# Patient Record
Sex: MALE | Race: WHITE | NOT HISPANIC OR LATINO | Employment: STUDENT | URBAN - METROPOLITAN AREA
[De-identification: names, ages, dates, MRNs, and addresses within clinical notes are randomized per-mention and may not be internally consistent; named-entity substitution may affect disease eponyms.]

---

## 2023-03-04 PROBLEM — L60.0 INGROWN TOENAIL: Status: ACTIVE | Noted: 2023-03-04

## 2023-03-04 PROBLEM — R41.840 ATTENTION DISTURBANCE: Status: ACTIVE | Noted: 2023-03-04

## 2023-03-04 PROBLEM — J01.90 ACUTE SINUS INFECTION: Status: ACTIVE | Noted: 2023-03-04

## 2023-03-04 PROBLEM — H60.92 OTITIS EXTERNA, LEFT: Status: ACTIVE | Noted: 2023-03-04

## 2023-03-04 PROBLEM — F41.9 ANXIETY: Status: ACTIVE | Noted: 2023-03-04

## 2023-03-04 PROBLEM — J06.9 VIRAL URI WITH COUGH: Status: ACTIVE | Noted: 2023-03-04

## 2023-03-04 PROBLEM — F32.A DEPRESSION: Status: ACTIVE | Noted: 2023-03-04

## 2023-03-04 PROBLEM — F98.8 ATTENTION DEFICIT DISORDER (ADD): Status: ACTIVE | Noted: 2023-03-04

## 2023-03-04 RX ORDER — SERTRALINE HYDROCHLORIDE 100 MG/1
1 TABLET, FILM COATED ORAL DAILY
COMMUNITY
Start: 2022-08-23

## 2023-03-13 ENCOUNTER — TELEMEDICINE (OUTPATIENT)
Dept: PEDIATRICS | Facility: CLINIC | Age: 23
End: 2023-03-13
Payer: COMMERCIAL

## 2023-03-13 DIAGNOSIS — F41.9 ANXIETY: Primary | ICD-10-CM

## 2023-03-13 PROCEDURE — 99213 OFFICE O/P EST LOW 20 MIN: CPT | Performed by: PEDIATRICS

## 2023-03-13 NOTE — PROGRESS NOTES
Subjective   Vargas Gilman is a 22 y.o. male who presents for follow up of anxiety disorder. Current symptoms include: Excessive anxiety/worry, Difficulty controlling worry, Difficulty concentrating/going blank, Fatigue, and Sleep changes--insomnia .    Other symptoms include:  fatigue  Symptoms are:  worse  Current suicidal risk:  None Reported  He complains of the following side effects from the treatment:  He previously had sexual dysfunction from the SSRI medication.  He stopped taking the Sertraline 3-4 months ago .   He is succeeding academically, but feels overwhelmed and is avoiding things that he used to enjoy due to anxiety.  He has difficulty sleeping and is taking 12 mg of melatonin at bedtime.  He is on his computer all day long-right up to bedtime.    He felt more engaged and enjoyed activities more when taking the Sertraline.        Objective   There were no vitals taken for this visit.   Physical Exam  Constitutional:       Appearance: Normal appearance.   Neurological:      Mental Status: He is alert.   Psychiatric:         Thought Content: Thought content normal.         Judgment: Judgment normal.         Assessment/Plan   Anxiety Disorder - worsening  Discussed restarting Sertraline pending appointment with psychiatry.  Medications: Selective Seratonin Reuptake Inhibitor sertraline 25 mg daily for 3 days, then 50 mg daily for 7 days, then 100 mg.  Counseling:  currently not in counseling.   Call with an update in 2-3 weeks.

## 2024-08-12 DIAGNOSIS — F41.9 ANXIETY DISORDER, UNSPECIFIED: ICD-10-CM

## 2024-08-12 RX ORDER — VILAZODONE HYDROCHLORIDE 40 MG/1
40 TABLET ORAL
Qty: 90 TABLET | Refills: 3 | Status: SHIPPED | OUTPATIENT
Start: 2024-08-12

## 2024-09-27 ENCOUNTER — APPOINTMENT (OUTPATIENT)
Dept: BEHAVIORAL HEALTH | Facility: CLINIC | Age: 24
End: 2024-09-27
Payer: COMMERCIAL

## 2024-09-27 DIAGNOSIS — F34.1 PERSISTENT DEPRESSIVE DISORDER: ICD-10-CM

## 2024-09-27 PROCEDURE — 99213 OFFICE O/P EST LOW 20 MIN: CPT | Performed by: PSYCHIATRY & NEUROLOGY

## 2024-09-27 RX ORDER — ARIPIPRAZOLE 5 MG/1
5 TABLET ORAL DAILY
Qty: 30 TABLET | Refills: 2 | Status: SHIPPED | OUTPATIENT
Start: 2024-09-27 | End: 2024-12-26

## 2024-09-27 ASSESSMENT — ENCOUNTER SYMPTOMS
DYSPHORIC MOOD: 1
NERVOUS/ANXIOUS: 1

## 2024-09-27 NOTE — ASSESSMENT & PLAN NOTE
Add Abilify 5 mg daily in the morning while continuing to take Viibryd 40 mg.  Warnings about akathisia, if this occurs cut dose in half.  Follow-up 4 weeks

## 2024-09-27 NOTE — PROGRESS NOTES
"Subjective   Patient ID: Vargas Gilman is a 23 y.o. male who presents for med management visit virtual.Virtual or Telephone Consent    An interactive audio and video telecommunication system which permits real time communications between the patient (at the originating site) and provider (at the distant site) was utilized to provide this telehealth service.   Verbal consent was requested and obtained from Vargas Gilman on this date, 09/27/24 for a telehealth visit.    HPI patient reports \"not doing well mentally\" high level of dread and feeling worthless.  Cannot motivate himself and not enjoying himself.  Not getting enjoyment interacting with people either.  Taking 40 mg of VIIBRYD.  Denies suicidal ideation.    Review of Systems   Psychiatric/Behavioral:  Positive for dysphoric mood. The patient is nervous/anxious.        Objective   Physical Exam  Psychiatric:         Attention and Perception: Attention and perception normal.         Mood and Affect: Mood is depressed. Affect is blunt.         Speech: Speech normal.         Behavior: Behavior normal. Behavior is cooperative.         Thought Content: Thought content normal. Thought content does not include suicidal ideation. Thought content does not include suicidal plan.         Cognition and Memory: Cognition and memory normal.         Judgment: Judgment normal.         Assessment/Plan   Problem List Items Addressed This Visit             ICD-10-CM    Depression F32.A     Add Abilify 5 mg daily in the morning while continuing to take Viibryd 40 mg.  Warnings about akathisia, if this occurs cut dose in half.  Follow-up 4 weeks         Relevant Medications    ARIPiprazole (Abilify) 5 mg tablet            Malik Fall MD 09/27/24 12:49 PM   "

## 2024-11-01 ENCOUNTER — APPOINTMENT (OUTPATIENT)
Dept: BEHAVIORAL HEALTH | Facility: CLINIC | Age: 24
End: 2024-11-01
Payer: COMMERCIAL

## 2025-01-21 DIAGNOSIS — F41.9 ANXIETY: Primary | ICD-10-CM

## 2025-01-21 DIAGNOSIS — F34.1 PERSISTENT DEPRESSIVE DISORDER: ICD-10-CM

## 2025-02-18 ENCOUNTER — OFFICE VISIT (OUTPATIENT)
Dept: PRIMARY CARE | Facility: CLINIC | Age: 25
End: 2025-02-18
Payer: COMMERCIAL

## 2025-02-18 VITALS
HEART RATE: 78 BPM | HEIGHT: 73 IN | SYSTOLIC BLOOD PRESSURE: 121 MMHG | OXYGEN SATURATION: 97 % | BODY MASS INDEX: 26.88 KG/M2 | WEIGHT: 202.8 LBS | DIASTOLIC BLOOD PRESSURE: 74 MMHG

## 2025-02-18 DIAGNOSIS — Z00.00 ANNUAL PHYSICAL EXAM: Primary | ICD-10-CM

## 2025-02-18 DIAGNOSIS — R11.2 NAUSEA AND VOMITING, UNSPECIFIED VOMITING TYPE: ICD-10-CM

## 2025-02-18 DIAGNOSIS — F41.9 ANXIETY: ICD-10-CM

## 2025-02-18 DIAGNOSIS — K21.00 GASTROESOPHAGEAL REFLUX DISEASE WITH ESOPHAGITIS WITHOUT HEMORRHAGE: ICD-10-CM

## 2025-02-18 DIAGNOSIS — R11.0 NAUSEA: ICD-10-CM

## 2025-02-18 DIAGNOSIS — K20.90 ESOPHAGITIS: ICD-10-CM

## 2025-02-18 PROBLEM — H60.92 OTITIS EXTERNA, LEFT: Status: RESOLVED | Noted: 2023-03-04 | Resolved: 2025-02-18

## 2025-02-18 PROBLEM — J01.90 ACUTE SINUS INFECTION: Status: RESOLVED | Noted: 2023-03-04 | Resolved: 2025-02-18

## 2025-02-18 PROBLEM — J06.9 VIRAL URI WITH COUGH: Status: RESOLVED | Noted: 2023-03-04 | Resolved: 2025-02-18

## 2025-02-18 PROCEDURE — 99385 PREV VISIT NEW AGE 18-39: CPT | Performed by: INTERNAL MEDICINE

## 2025-02-18 PROCEDURE — 1036F TOBACCO NON-USER: CPT | Performed by: INTERNAL MEDICINE

## 2025-02-18 PROCEDURE — 3008F BODY MASS INDEX DOCD: CPT | Performed by: INTERNAL MEDICINE

## 2025-02-18 RX ORDER — OMEPRAZOLE 40 MG/1
40 CAPSULE, DELAYED RELEASE ORAL
Qty: 30 CAPSULE | Refills: 2 | Status: SHIPPED | OUTPATIENT
Start: 2025-02-18

## 2025-02-18 RX ORDER — CHOLECALCIFEROL (VITAMIN D3) 25 MCG
1000 TABLET ORAL DAILY
COMMUNITY

## 2025-02-18 ASSESSMENT — ENCOUNTER SYMPTOMS
ABDOMINAL DISTENTION: 0
CHEST TIGHTNESS: 0
CHOKING: 0
DIFFICULTY URINATING: 0
SLEEP DISTURBANCE: 0
SHORTNESS OF BREATH: 0
PALPITATIONS: 0
TROUBLE SWALLOWING: 0
LIGHT-HEADEDNESS: 0
NUMBNESS: 0
DIARRHEA: 1
HEMATURIA: 0
FEVER: 0
APPETITE CHANGE: 1
NAUSEA: 1
BLOOD IN STOOL: 0
COUGH: 0
VOMITING: 1
DYSURIA: 0
CHILLS: 0
SORE THROAT: 0

## 2025-02-18 ASSESSMENT — PATIENT HEALTH QUESTIONNAIRE - PHQ9
10. IF YOU CHECKED OFF ANY PROBLEMS, HOW DIFFICULT HAVE THESE PROBLEMS MADE IT FOR YOU TO DO YOUR WORK, TAKE CARE OF THINGS AT HOME, OR GET ALONG WITH OTHER PEOPLE: SOMEWHAT DIFFICULT
2. FEELING DOWN, DEPRESSED OR HOPELESS: SEVERAL DAYS
SUM OF ALL RESPONSES TO PHQ9 QUESTIONS 1 AND 2: 1
1. LITTLE INTEREST OR PLEASURE IN DOING THINGS: NOT AT ALL

## 2025-02-18 ASSESSMENT — PAIN SCALES - GENERAL: PAINLEVEL_OUTOF10: 0-NO PAIN

## 2025-02-18 NOTE — ASSESSMENT & PLAN NOTE
Patient currently taking Viibryd 40 mg daily. States his previous psychiatrist passed away 2 years ago and hasn't seen anyone since, plus [place] report does not show any prescriptions so it is unclear how he's continued this medciation. He has appt scheduled for March 6th at [facility] psychiatry practice.

## 2025-02-18 NOTE — ASSESSMENT & PLAN NOTE
Patient has 3 week hx of nausea/vomiting/dyspepsia (see HPI). Denies hematemesis.     -Start omeprazole 40 mg daily empiric treatment  -Ordered urea breath test to r/o H pylori infection  -Ordered lipase and amylase to r/o pancreatic dysfunction  -Follow up in 3-4 weeks to reassess symptoms

## 2025-02-18 NOTE — ASSESSMENT & PLAN NOTE
Patient has 3 week hx of nausea/vomiting/dyspepsia (see HPI). Denies hematemesis.      -Start omeprazole 40 mg daily  -Ordered urea breath test to r/o H pylori infection  -Ordered lipase and amylase to r/o pancreatic dysfunction  -Follow up in 3-4 weeks to reassess symptoms

## 2025-02-18 NOTE — PATIENT INSTRUCTIONS
Please get fasting labs and breath test    Please follow up with Dr. Martinez in 3 to 4 weeks  30 min appointment

## 2025-02-18 NOTE — PROGRESS NOTES
Subjective   Patient ID: Vargas Gilman is a 24 y.o. male who presents for No chief complaint on file..  Patient presents to establish care and 3 week history of nausea/vomiting. Patient wakes up every morning and feels nauseous, shaky, and will vomit before breakfast and reports its watery with some acidic content, denies hematemesis. After he vomits he will feel slightly better but nausea will continue for 5-6 hours afterward and is associated with a sense of indigestion and sometimes waterbrash. He has tried taking Tums and using ninoska chews to alleviate his symptoms however it has been no help, in the past he's eaten marijuana edibles but has not taken one since his symptoms started. He denies any recent illness, sick contacts, or travel.    He also reports recent changes in his bowel movements; normally he'd have 2 bowel movements per week but recently has had watery diarrhea once a day and notes it's been more foul-smelling than usual. Also, he's had increased flatulence. He denies any black/tarry stools, hematochezia, or straining with BM's. Patient was advised to avoid marijuana until he's seen again in 3-4 weeks to reassess. We discussed getting an H pylori breath test and additional blood work in addition to annual routine labs to r/o an upper GI issue.    Patient is currently taking 40 mg Viibryd daily but does not have an established psychiatrist. He previously used Abilify 5 mg daily as well but did not get this refilled and so was splitting tablets in half until he ran out about 3 weeks ago. He reports he has an upcoming appt in March with a new psychiatrist where he hopes to establish care.         Review of Systems   Constitutional:  Positive for appetite change. Negative for chills and fever.   HENT:  Negative for ear discharge, ear pain, sore throat and trouble swallowing.    Eyes:  Negative for visual disturbance.   Respiratory:  Negative for cough, choking, chest tightness and shortness of  "breath.    Cardiovascular:  Negative for chest pain, palpitations and leg swelling.   Gastrointestinal:  Positive for diarrhea, nausea and vomiting. Negative for abdominal distention and blood in stool.        Dyspepsia.   Genitourinary:  Negative for difficulty urinating, dysuria and hematuria.   Skin:  Negative for rash.   Neurological:  Negative for syncope, light-headedness and numbness.   Psychiatric/Behavioral:  Negative for sleep disturbance.        Objective   Medication Documentation Review Audit       Reviewed by Vesna Martinez MD (Physician) on 25 at 1118      Medication Order Taking? Sig Documenting Provider Last Dose Status   ARIPiprazole (Abilify) 5 mg tablet [id number]  Take 1 tablet (5 mg) by mouth once daily. Malik Fall MD   24 2359   cholecalciferol (Vitamin D3) 25 MCG (1000 UT) tablet [id number]  Take 1 tablet (1,000 Units) by mouth once daily. Historical Provider, MD  Active   omeprazole (PriLOSEC) 40 mg DR capsule [id number]  Take 1 capsule (40 mg) by mouth once daily in the morning. Take before meals. Do not crush or chew. Vesna Martinez MD  Active   vilazodone (Viibryd) 40 mg tablet [id number] Yes TAKE 1 TABLET BY MOUTH EVERY MORNING Malik Fall MD  Active                  No Known Allergies    /74   Pulse 78   Ht 1.848 m (6' 0.75\")   Wt 92 kg (202 lb 12.8 oz)   SpO2 97%   BMI 26.94 kg/m²     Physical Exam  Constitutional:       General: He is not in acute distress.     Appearance: Normal appearance. He is not ill-appearing.   HENT:      Head: Normocephalic and atraumatic.      Right Ear: Tympanic membrane, ear canal and external ear normal. There is no impacted cerumen.      Left Ear: Tympanic membrane, ear canal and external ear normal. There is no impacted cerumen.      Mouth/Throat:      Mouth: Mucous membranes are moist.      Pharynx: Oropharynx is clear. No oropharyngeal exudate or posterior oropharyngeal erythema.   Eyes:      Extraocular Movements: " Extraocular movements intact.      Pupils: Pupils are equal, round, and reactive to light.   Cardiovascular:      Rate and Rhythm: Normal rate and regular rhythm.      Pulses: Normal pulses.      Heart sounds: Normal heart sounds. No murmur heard.  Pulmonary:      Effort: Pulmonary effort is normal.      Breath sounds: Normal breath sounds. No wheezing, rhonchi or rales.   Abdominal:      General: Abdomen is flat. Bowel sounds are normal. There is no distension.      Palpations: Abdomen is soft. There is no mass.      Tenderness: There is no abdominal tenderness. There is no guarding.   Genitourinary:     Penis: Normal.       Testes: Normal.   Musculoskeletal:         General: Normal range of motion.      Cervical back: Normal range of motion and neck supple. No rigidity or tenderness.      Right lower leg: No edema.      Left lower leg: No edema.   Skin:     General: Skin is warm and dry.   Neurological:      General: No focal deficit present.      Mental Status: He is alert and oriented to person, place, and time. Mental status is at baseline.      Sensory: No sensory deficit.      Motor: No weakness.   Psychiatric:         Mood and Affect: Mood normal.         Behavior: Behavior normal.         Thought Content: Thought content normal.         Judgment: Judgment normal.           Assessment/Plan   Problem List Items Addressed This Visit       Anxiety     Patient currently taking Viibryd 40 mg daily. States his previous psychiatrist passed away 2 years ago and hasn't seen anyone since, plus [place] report does not show any prescriptions so it is unclear how he's continued this medciation. He has appt scheduled for March 6th at [facility].          Annual physical exam - Primary     -Ordered routine annual labs including CBC, CMP, TSH, Vitamin D, lipid panel         Relevant Orders    Lipid Panel    CBC    Comprehensive Metabolic Panel    TSH with reflex to Free T4 if abnormal    Vitamin D  25-Hydroxy,Total (for eval of Vitamin D levels)    H. Pylori Breath Test    Amylase    Lipase    Nausea and vomiting     Patient has 3 week hx of nausea/vomiting/dyspepsia (see HPI). Denies hematemesis.      -Start omeprazole 40 mg daily  -Ordered urea breath test to r/o H pylori infection  -Ordered lipase and amylase to r/o pancreatic dysfunction  -Follow up in 3-4 weeks to reassess symptoms         Relevant Medications    omeprazole (PriLOSEC) 40 mg DR capsule    Other Relevant Orders    Lipid Panel    CBC    Comprehensive Metabolic Panel    TSH with reflex to Free T4 if abnormal    Vitamin D 25-Hydroxy,Total (for eval of Vitamin D levels)    H. Pylori Breath Test    Amylase    Lipase    Gastroesophageal reflux disease with esophagitis without hemorrhage     Patient has 3 week hx of nausea/vomiting/dyspepsia (see HPI). Denies hematemesis.     -Start omeprazole 40 mg daily empiric treatment  -Ordered urea breath test to r/o H pylori infection  -Ordered lipase and amylase to r/o pancreatic dysfunction  -Follow up in 3-4 weeks to reassess symptoms         Relevant Medications    omeprazole (PriLOSEC) 40 mg DR capsule    Other Relevant Orders    Lipid Panel    CBC    Comprehensive Metabolic Panel    TSH with reflex to Free T4 if abnormal    Vitamin D 25-Hydroxy,Total (for eval of Vitamin D levels)    H. Pylori Breath Test    Amylase    Lipase       This note or encounter for this patient visit included a student.  I have independently interviewed the patient, performed a physical exam and performed my own assessment and plan and orders.         It has been a pleasure seeing you.  Vesna Martinez MD

## 2025-02-21 LAB
25(OH)D3+25(OH)D2 SERPL-MCNC: 16 NG/ML (ref 30–100)
ALBUMIN SERPL-MCNC: 4.8 G/DL (ref 3.6–5.1)
ALP SERPL-CCNC: 49 U/L (ref 36–130)
ALT SERPL-CCNC: 55 U/L (ref 9–46)
AMYLASE SERPL-CCNC: 59 U/L (ref 21–101)
ANION GAP SERPL CALCULATED.4IONS-SCNC: 10 MMOL/L (CALC) (ref 7–17)
AST SERPL-CCNC: 24 U/L (ref 10–40)
BILIRUB SERPL-MCNC: 1.2 MG/DL (ref 0.2–1.2)
BUN SERPL-MCNC: 12 MG/DL (ref 7–25)
CALCIUM SERPL-MCNC: 9.9 MG/DL (ref 8.6–10.3)
CHLORIDE SERPL-SCNC: 108 MMOL/L (ref 98–110)
CHOLEST SERPL-MCNC: 211 MG/DL
CHOLEST/HDLC SERPL: 5.7 (CALC)
CO2 SERPL-SCNC: 23 MMOL/L (ref 20–32)
CREAT SERPL-MCNC: 0.95 MG/DL (ref 0.6–1.24)
EGFRCR SERPLBLD CKD-EPI 2021: 115 ML/MIN/1.73M2
ERYTHROCYTE [DISTWIDTH] IN BLOOD BY AUTOMATED COUNT: 12.8 % (ref 11–15)
GLUCOSE SERPL-MCNC: 103 MG/DL (ref 65–99)
HCT VFR BLD AUTO: 48.5 % (ref 38.5–50)
HDLC SERPL-MCNC: 37 MG/DL
HGB BLD-MCNC: 16.6 G/DL (ref 13.2–17.1)
LDLC SERPL CALC-MCNC: 142 MG/DL (CALC)
LIPASE SERPL-CCNC: 35 U/L (ref 7–60)
MCH RBC QN AUTO: 31.3 PG (ref 27–33)
MCHC RBC AUTO-ENTMCNC: 34.2 G/DL (ref 32–36)
MCV RBC AUTO: 91.5 FL (ref 80–100)
NONHDLC SERPL-MCNC: 174 MG/DL (CALC)
PLATELET # BLD AUTO: 311 THOUSAND/UL (ref 140–400)
PMV BLD REES-ECKER: 10.6 FL (ref 7.5–12.5)
POTASSIUM SERPL-SCNC: 4.3 MMOL/L (ref 3.5–5.3)
PROT SERPL-MCNC: 6.9 G/DL (ref 6.1–8.1)
RBC # BLD AUTO: 5.3 MILLION/UL (ref 4.2–5.8)
SODIUM SERPL-SCNC: 141 MMOL/L (ref 135–146)
TRIGL SERPL-MCNC: 187 MG/DL
TSH SERPL-ACNC: 0.95 MIU/L (ref 0.4–4.5)
UREA BREATH TEST QL: NOT DETECTED
WBC # BLD AUTO: 5.4 THOUSAND/UL (ref 3.8–10.8)

## 2025-02-25 ENCOUNTER — APPOINTMENT (OUTPATIENT)
Dept: PRIMARY CARE | Facility: CLINIC | Age: 25
End: 2025-02-25
Payer: COMMERCIAL

## 2025-02-25 ENCOUNTER — TELEPHONE (OUTPATIENT)
Dept: PRIMARY CARE | Facility: CLINIC | Age: 25
End: 2025-02-25

## 2025-02-25 DIAGNOSIS — K21.00 GASTROESOPHAGEAL REFLUX DISEASE WITH ESOPHAGITIS WITHOUT HEMORRHAGE: ICD-10-CM

## 2025-02-25 DIAGNOSIS — R11.14 BILIOUS VOMITING WITH NAUSEA: Primary | ICD-10-CM

## 2025-02-25 NOTE — TELEPHONE ENCOUNTER
----- Message from Vesna Martinez sent at 2/25/2025  7:48 AM EST -----  Please notify patient most of his testing looks pretty good.  The breath test was negative for an infection causing his stomach symptoms or reflux  Amylase and lipase were both normal meaning he does not have pancreatitis.  Complete blood count was unremarkable showing no acute infection    Patient's cholesterol level is elevated with an LDL of 142.  LDL should be less than 100 so he needs to work on a low-fat diet  Chemistry profile had a mild elevation in one of his liver test and his sugar or glucose which makes him prediabetic.  We will discuss these labs in more detail at his appointment in March.  Please make sure he keeps that appointment    Finally patient's vitamin D level is very low.  I would like him to take 5000 international units of vitamin D3 every day until he sees me in March and we will discuss further treatment at that time.

## 2025-03-06 ENCOUNTER — APPOINTMENT (OUTPATIENT)
Dept: BEHAVIORAL HEALTH | Facility: CLINIC | Age: 25
End: 2025-03-06
Payer: COMMERCIAL

## 2025-03-06 DIAGNOSIS — F41.1 GAD (GENERALIZED ANXIETY DISORDER): ICD-10-CM

## 2025-03-06 DIAGNOSIS — F33.1 MODERATE EPISODE OF RECURRENT MAJOR DEPRESSIVE DISORDER: Primary | ICD-10-CM

## 2025-03-06 DIAGNOSIS — F42.8 OBSESSIVE THINKING: ICD-10-CM

## 2025-03-06 PROBLEM — F41.9 ANXIETY: Status: RESOLVED | Noted: 2023-03-04 | Resolved: 2025-03-06

## 2025-03-06 PROCEDURE — 1036F TOBACCO NON-USER: CPT | Performed by: NURSE PRACTITIONER

## 2025-03-06 PROCEDURE — 90792 PSYCH DIAG EVAL W/MED SRVCS: CPT | Performed by: NURSE PRACTITIONER

## 2025-03-06 RX ORDER — CALCIUM CARBONATE 200(500)MG
1 TABLET,CHEWABLE ORAL 4 TIMES DAILY PRN
COMMUNITY

## 2025-03-06 RX ORDER — VILAZODONE HYDROCHLORIDE 40 MG/1
40 TABLET ORAL
Qty: 90 TABLET | Refills: 3 | Status: SHIPPED | OUTPATIENT
Start: 2025-03-06 | End: 2026-03-06

## 2025-03-06 ASSESSMENT — ANXIETY QUESTIONNAIRES
IF YOU CHECKED OFF ANY PROBLEMS ON THIS QUESTIONNAIRE, HOW DIFFICULT HAVE THESE PROBLEMS MADE IT FOR YOU TO DO YOUR WORK, TAKE CARE OF THINGS AT HOME, OR GET ALONG WITH OTHER PEOPLE: VERY DIFFICULT
5. BEING SO RESTLESS THAT IT IS HARD TO SIT STILL: SEVERAL DAYS
6. BECOMING EASILY ANNOYED OR IRRITABLE: SEVERAL DAYS
1. FEELING NERVOUS, ANXIOUS, OR ON EDGE: MORE THAN HALF THE DAYS
GAD7 TOTAL SCORE: 11
7. FEELING AFRAID AS IF SOMETHING AWFUL MIGHT HAPPEN: SEVERAL DAYS
3. WORRYING TOO MUCH ABOUT DIFFERENT THINGS: MORE THAN HALF THE DAYS
2. NOT BEING ABLE TO STOP OR CONTROL WORRYING: MORE THAN HALF THE DAYS
4. TROUBLE RELAXING: MORE THAN HALF THE DAYS

## 2025-03-06 ASSESSMENT — PATIENT HEALTH QUESTIONNAIRE - PHQ9
7. TROUBLE CONCENTRATING ON THINGS, SUCH AS READING THE NEWSPAPER OR WATCHING TELEVISION: SEVERAL DAYS
9. THOUGHTS THAT YOU WOULD BE BETTER OFF DEAD, OR OF HURTING YOURSELF: NOT AT ALL
8. MOVING OR SPEAKING SO SLOWLY THAT OTHER PEOPLE COULD HAVE NOTICED. OR THE OPPOSITE, BEING SO FIGETY OR RESTLESS THAT YOU HAVE BEEN MOVING AROUND A LOT MORE THAN USUAL: NOT AT ALL
6. FEELING BAD ABOUT YOURSELF - OR THAT YOU ARE A FAILURE OR HAVE LET YOURSELF OR YOUR FAMILY DOWN: MORE THAN HALF THE DAYS
10. IF YOU CHECKED OFF ANY PROBLEMS, HOW DIFFICULT HAVE THESE PROBLEMS MADE IT FOR YOU TO DO YOUR WORK, TAKE CARE OF THINGS AT HOME, OR GET ALONG WITH OTHER PEOPLE: VERY DIFFICULT
1. LITTLE INTEREST OR PLEASURE IN DOING THINGS: MORE THAN HALF THE DAYS
5. POOR APPETITE OR OVEREATING: MORE THAN HALF THE DAYS
4. FEELING TIRED OR HAVING LITTLE ENERGY: MORE THAN HALF THE DAYS
2. FEELING DOWN, DEPRESSED OR HOPELESS: MORE THAN HALF THE DAYS
3. TROUBLE FALLING OR STAYING ASLEEP OR SLEEPING TOO MUCH: SEVERAL DAYS
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
## 2025-03-11 ENCOUNTER — OFFICE VISIT (OUTPATIENT)
Dept: PRIMARY CARE | Facility: CLINIC | Age: 25
End: 2025-03-11
Payer: COMMERCIAL

## 2025-03-11 ENCOUNTER — APPOINTMENT (OUTPATIENT)
Dept: PRIMARY CARE | Facility: CLINIC | Age: 25
End: 2025-03-11
Payer: COMMERCIAL

## 2025-03-11 VITALS
HEIGHT: 73 IN | BODY MASS INDEX: 27.17 KG/M2 | SYSTOLIC BLOOD PRESSURE: 127 MMHG | DIASTOLIC BLOOD PRESSURE: 83 MMHG | HEART RATE: 76 BPM | OXYGEN SATURATION: 98 % | WEIGHT: 205 LBS

## 2025-03-11 DIAGNOSIS — R79.89 ELEVATED LFTS: ICD-10-CM

## 2025-03-11 DIAGNOSIS — K29.70 GASTRITIS WITHOUT BLEEDING, UNSPECIFIED CHRONICITY, UNSPECIFIED GASTRITIS TYPE: ICD-10-CM

## 2025-03-11 DIAGNOSIS — K20.90 ESOPHAGITIS: ICD-10-CM

## 2025-03-11 DIAGNOSIS — R73.01 ELEVATED FASTING GLUCOSE: ICD-10-CM

## 2025-03-11 DIAGNOSIS — K21.00 GASTROESOPHAGEAL REFLUX DISEASE WITH ESOPHAGITIS WITHOUT HEMORRHAGE: ICD-10-CM

## 2025-03-11 DIAGNOSIS — E78.2 MIXED HYPERLIPIDEMIA: ICD-10-CM

## 2025-03-11 DIAGNOSIS — R11.2 NAUSEA AND VOMITING, UNSPECIFIED VOMITING TYPE: ICD-10-CM

## 2025-03-11 DIAGNOSIS — R11.0 NAUSEA: ICD-10-CM

## 2025-03-11 DIAGNOSIS — E78.5 HYPERLIPIDEMIA, UNSPECIFIED HYPERLIPIDEMIA TYPE: Primary | ICD-10-CM

## 2025-03-11 PROCEDURE — 3008F BODY MASS INDEX DOCD: CPT | Performed by: INTERNAL MEDICINE

## 2025-03-11 PROCEDURE — 1036F TOBACCO NON-USER: CPT | Performed by: INTERNAL MEDICINE

## 2025-03-11 PROCEDURE — G8433 SCR FOR DEP NOT CPT DOC RSN: HCPCS | Performed by: INTERNAL MEDICINE

## 2025-03-11 PROCEDURE — 99214 OFFICE O/P EST MOD 30 MIN: CPT | Performed by: INTERNAL MEDICINE

## 2025-03-11 RX ORDER — OMEPRAZOLE 40 MG/1
40 CAPSULE, DELAYED RELEASE ORAL
Qty: 30 CAPSULE | Refills: 2 | Status: SHIPPED | OUTPATIENT
Start: 2025-03-11

## 2025-03-11 ASSESSMENT — ENCOUNTER SYMPTOMS
ABDOMINAL PAIN: 0
DYSURIA: 0
DIARRHEA: 0
FREQUENCY: 0
FATIGUE: 0
SORE THROAT: 0
LIGHT-HEADEDNESS: 0
ARTHRALGIAS: 0
CONSTIPATION: 0
TROUBLE SWALLOWING: 0
VOMITING: 1
FEVER: 0
NAUSEA: 1
DIZZINESS: 0
SHORTNESS OF BREATH: 0
PALPITATIONS: 0
COUGH: 0

## 2025-03-11 ASSESSMENT — PAIN SCALES - GENERAL: PAINLEVEL_OUTOF10: 0-NO PAIN

## 2025-03-11 NOTE — PROGRESS NOTES
Subjective   Patient ID: Vargas Gilman is a 24 y.o. male who presents for 1 month follow up for general health management.    Patient is here for follow-up after his first visit for nausea vomiting and presumed gastritis.  Blood work was reviewed with the patient in detail today including an H. pylori test which was negative.  What was more important was mild elevation in ALT at 55, LDL and triglyceride elevation in his cholesterol profile and a mild elevation in his glucose or sugar.  Patient was counseled about a low-fat low carbohydrate diet.  Of the 2 I would prefer that he concentrate on the low-fat diet    Patient notes that he has only had vomiting once or twice since he started on the omeprazole.  He did have some nausea every morning for about an hour but it is no longer acting up all day long and he is overall greatly improved.  Patient is concerned about how long he can take the omeprazole.        Review of Systems   Constitutional:  Negative for fatigue and fever.   HENT:  Negative for sore throat and trouble swallowing.    Eyes:  Negative for visual disturbance.   Respiratory:  Negative for cough and shortness of breath.    Cardiovascular:  Negative for chest pain, palpitations and leg swelling.   Gastrointestinal:  Positive for nausea and vomiting. Negative for abdominal pain, constipation and diarrhea.   Genitourinary:  Negative for dysuria and frequency.   Musculoskeletal:  Negative for arthralgias.   Skin:  Negative for rash.   Neurological:  Negative for dizziness and light-headedness.       Objective   Medication Documentation Review Audit       Reviewed by Vesna Martinez MD (Physician) on 03/11/25 at 1112      Medication Order Taking? Sig Documenting Provider Last Dose Status   calcium carbonate (Tums) 200 mg calcium chewable tablet [id number] Yes Chew 1 tablet (500 mg) 4 times a day as needed for indigestion or heartburn. Historical Provider, MD  Active   cholecalciferol (Vitamin D3) 25 MCG  "(1000 UT) tablet [id number] Yes Take 1 tablet (1,000 Units) by mouth once daily. Historical Provider, MD  Active   omeprazole (PriLOSEC) 40 mg DR capsule [id number] Yes Take 1 capsule (40 mg) by mouth once daily in the morning. Take before meals. Do not crush or chew. Vesna Martinez MD  Active     Discontinued 03/06/25 0912   vilazodone (Viibryd) 40 mg tablet [id number] Yes Take 1 tablet (40 mg) by mouth once daily in the morning. Take before meals. Ryan Pickard, APRN-CNP  Active                  No Known Allergies    /83   Pulse 76   Ht 1.848 m (6' 0.75\")   Wt 93 kg (205 lb)   SpO2 98%   BMI 27.23 kg/m²     Physical Exam  Constitutional:       Appearance: Normal appearance.   HENT:      Head: Normocephalic and atraumatic.      Nose: Nose normal.   Eyes:      Extraocular Movements: Extraocular movements intact.      Pupils: Pupils are equal, round, and reactive to light.   Cardiovascular:      Rate and Rhythm: Normal rate and regular rhythm.   Pulmonary:      Breath sounds: Normal breath sounds.   Abdominal:      General: Abdomen is flat. Bowel sounds are normal.      Palpations: Abdomen is soft.   Musculoskeletal:      Right lower leg: No edema.      Left lower leg: No edema.   Neurological:      Mental Status: He is alert.           Assessment/Plan   Problem List Items Addressed This Visit       Nausea and vomiting    Relevant Medications    omeprazole (PriLOSEC) 40 mg DR capsule    Gastroesophageal reflux disease with esophagitis without hemorrhage    Relevant Medications    omeprazole (PriLOSEC) 40 mg DR capsule    Gastritis     Patient's symptoms of nausea vomiting and reflux have greatly improved on omeprazole 40 mg daily.  I would like him to stay on the 40 mg dose for the next 2 months at which time I will follow-up with him and if he is continuing to improve we will try to decrease the dose at that time.         Mixed hyperlipidemia     Patient has a mixed hyperlipidemia so he is going to work " primarily on a low-fat diet and we will repeat his lipid profile before his follow-up in 2 months         Elevated fasting glucose     Has a mildly elevated fasting glucose so he is encouraged to work on low-carb diet but I think his low-fat diet is more important at this time.          Other Visit Diagnoses       Hyperlipidemia, unspecified hyperlipidemia type    -  Primary    Relevant Orders    Lipid panel    Nausea        Relevant Medications    omeprazole (PriLOSEC) 40 mg DR capsule    Esophagitis        Relevant Medications    omeprazole (PriLOSEC) 40 mg DR capsule    Elevated LFTs        Relevant Orders    Hepatic Function Panel                   It has been a pleasure seeing you.  Vesna Martinez MD

## 2025-03-11 NOTE — ASSESSMENT & PLAN NOTE
Has a mildly elevated fasting glucose so he is encouraged to work on low-carb diet but I think his low-fat diet is more important at this time.

## 2025-03-11 NOTE — ASSESSMENT & PLAN NOTE
Patient's symptoms of nausea vomiting and reflux have greatly improved on omeprazole 40 mg daily.  I would like him to stay on the 40 mg dose for the next 2 months at which time I will follow-up with him and if he is continuing to improve we will try to decrease the dose at that time.

## 2025-03-11 NOTE — ASSESSMENT & PLAN NOTE
Patient has a mixed hyperlipidemia so he is going to work primarily on a low-fat diet and we will repeat his lipid profile before his follow-up in 2 months

## 2025-03-11 NOTE — PATIENT INSTRUCTIONS
Follow up Dr Martinez in 2 months with 30 min appointment for GERD and chol    Get fasting labs the week before next appointment

## 2025-04-11 DIAGNOSIS — E78.5 HYPERLIPIDEMIA, UNSPECIFIED HYPERLIPIDEMIA TYPE: ICD-10-CM

## 2025-04-11 DIAGNOSIS — R79.89 ELEVATED LFTS: ICD-10-CM

## 2025-05-07 LAB
ALBUMIN SERPL-MCNC: 4.5 G/DL (ref 3.6–5.1)
ALBUMIN/GLOB SERPL: 1.9 (CALC) (ref 1–2.5)
ALP SERPL-CCNC: 56 U/L (ref 36–130)
ALT SERPL-CCNC: 76 U/L (ref 9–46)
AST SERPL-CCNC: 33 U/L (ref 10–40)
BILIRUB DIRECT SERPL-MCNC: 0.1 MG/DL
BILIRUB INDIRECT SERPL-MCNC: 1 MG/DL (CALC) (ref 0.2–1.2)
BILIRUB SERPL-MCNC: 1.1 MG/DL (ref 0.2–1.2)
CHOLEST SERPL-MCNC: 237 MG/DL
CHOLEST/HDLC SERPL: 5.8 (CALC)
GLOBULIN SER CALC-MCNC: 2.4 G/DL (CALC) (ref 1.9–3.7)
HDLC SERPL-MCNC: 41 MG/DL
LDLC SERPL CALC-MCNC: 148 MG/DL (CALC)
NONHDLC SERPL-MCNC: 196 MG/DL (CALC)
PROT SERPL-MCNC: 6.9 G/DL (ref 6.1–8.1)
TRIGL SERPL-MCNC: 310 MG/DL

## 2025-05-13 ENCOUNTER — APPOINTMENT (OUTPATIENT)
Dept: PRIMARY CARE | Facility: CLINIC | Age: 25
End: 2025-05-13
Payer: COMMERCIAL

## 2025-05-13 VITALS
DIASTOLIC BLOOD PRESSURE: 85 MMHG | HEIGHT: 73 IN | HEART RATE: 86 BPM | SYSTOLIC BLOOD PRESSURE: 126 MMHG | WEIGHT: 213 LBS | OXYGEN SATURATION: 97 % | BODY MASS INDEX: 28.23 KG/M2

## 2025-05-13 DIAGNOSIS — R11.0 NAUSEA: ICD-10-CM

## 2025-05-13 DIAGNOSIS — R74.01 ELEVATED ALT MEASUREMENT: Primary | ICD-10-CM

## 2025-05-13 DIAGNOSIS — K20.90 ESOPHAGITIS: ICD-10-CM

## 2025-05-13 DIAGNOSIS — E78.2 MIXED HYPERLIPIDEMIA: ICD-10-CM

## 2025-05-13 DIAGNOSIS — K21.00 GASTROESOPHAGEAL REFLUX DISEASE WITH ESOPHAGITIS WITHOUT HEMORRHAGE: ICD-10-CM

## 2025-05-13 DIAGNOSIS — R11.2 NAUSEA AND VOMITING, UNSPECIFIED VOMITING TYPE: ICD-10-CM

## 2025-05-13 PROCEDURE — G8433 SCR FOR DEP NOT CPT DOC RSN: HCPCS | Performed by: INTERNAL MEDICINE

## 2025-05-13 PROCEDURE — 3008F BODY MASS INDEX DOCD: CPT | Performed by: INTERNAL MEDICINE

## 2025-05-13 PROCEDURE — 1036F TOBACCO NON-USER: CPT | Performed by: INTERNAL MEDICINE

## 2025-05-13 PROCEDURE — 99214 OFFICE O/P EST MOD 30 MIN: CPT | Performed by: INTERNAL MEDICINE

## 2025-05-13 RX ORDER — OMEPRAZOLE 40 MG/1
40 CAPSULE, DELAYED RELEASE ORAL
Qty: 30 CAPSULE | Refills: 2 | Status: SHIPPED | OUTPATIENT
Start: 2025-05-13

## 2025-05-13 ASSESSMENT — ENCOUNTER SYMPTOMS
CONSTIPATION: 0
NAUSEA: 1
SORE THROAT: 0
ABDOMINAL PAIN: 0
ARTHRALGIAS: 0
FATIGUE: 0
DIARRHEA: 0
DIZZINESS: 0
DYSURIA: 0
FREQUENCY: 0
LIGHT-HEADEDNESS: 0
TROUBLE SWALLOWING: 0
PALPITATIONS: 0
VOMITING: 1
FEVER: 0
SHORTNESS OF BREATH: 0
COUGH: 0

## 2025-05-13 ASSESSMENT — PAIN SCALES - GENERAL: PAINLEVEL_OUTOF10: 0-NO PAIN

## 2025-05-13 NOTE — ASSESSMENT & PLAN NOTE
Patient's ALT is also mildly elevated.  It is still well less than 100 and I am not overly concerned however we will check a hepatitis profile just to make sure he does not have hepatitis as the cause.

## 2025-05-13 NOTE — PATIENT INSTRUCTIONS
Other Please work on a low fat diet and a low carbohydrate diet .    Continue the omeprazole 40 mg a day    Follow up Dr Martinez in 2 months for follow up on GERD    Get blood drawn for liver test today

## 2025-05-13 NOTE — PROGRESS NOTES
Subjective   Patient ID: Vargas Gilman is a 24 y.o. male who presents for meds and general health management.    Patient is here for 2-month follow-up for his gastroesophageal reflux disease symptoms.  Patient is now on omeprazole 40 mg daily and he is amazed at how much better he feels.  He did have 1 episode of vomiting since I saw him last but overall is at least 70 to 80% better.      Labs were reviewed with the patient in detail today.  He is quite concerned about his cholesterol levels and elevated ALT.  I have reassured him that none of the levels are critical and that we had plenty of time to correct them and evaluate them.        Review of Systems   Constitutional:  Negative for fatigue and fever.   HENT:  Negative for sore throat and trouble swallowing.    Eyes:  Negative for visual disturbance.   Respiratory:  Negative for cough and shortness of breath.    Cardiovascular:  Negative for chest pain, palpitations and leg swelling.   Gastrointestinal:  Positive for nausea and vomiting. Negative for abdominal pain, constipation and diarrhea.   Genitourinary:  Negative for dysuria and frequency.   Musculoskeletal:  Negative for arthralgias.   Skin:  Negative for rash.   Neurological:  Negative for dizziness and light-headedness.       Objective   Medication Documentation Review Audit       Reviewed by Vesna Martinez MD (Physician) on 05/13/25 at 0825      Medication Order Taking? Sig Documenting Provider Last Dose Status   calcium carbonate (Tums) 200 mg calcium chewable tablet [id number] Yes Chew 1 tablet (500 mg) 4 times a day as needed for indigestion or heartburn. Historical Provider, MD  Active   cholecalciferol (Vitamin D3) 25 MCG (1000 UT) tablet [id number] Yes Take 1 tablet (1,000 Units) by mouth once daily. Historical Provider, MD  Active   omeprazole (PriLOSEC) 40 mg DR capsule [id number] Yes Take 1 capsule (40 mg) by mouth once daily in the morning. Take before meals. Do not crush or chew. Vesna MUIR  "MD Juan  Active   vilazodone (Viibryd) 40 mg tablet [id number] Yes Take 1 tablet (40 mg) by mouth once daily in the morning. Take before meals. Ryan Pickard, APRN-CNP  Active                  Allergies[1]    /85   Pulse 86   Ht 1.848 m (6' 0.75\")   Wt 96.6 kg (213 lb)   SpO2 97%   BMI 28.30 kg/m²     Physical Exam  Constitutional:       Appearance: Normal appearance.   HENT:      Head: Normocephalic and atraumatic.      Nose: Nose normal.   Eyes:      Extraocular Movements: Extraocular movements intact.      Pupils: Pupils are equal, round, and reactive to light.   Cardiovascular:      Rate and Rhythm: Normal rate and regular rhythm.   Pulmonary:      Breath sounds: Normal breath sounds.   Abdominal:      General: Abdomen is flat. Bowel sounds are normal.      Palpations: Abdomen is soft.   Musculoskeletal:      Right lower leg: No edema.      Left lower leg: No edema.   Neurological:      Mental Status: He is alert.           Assessment/Plan   Problem List Items Addressed This Visit       Nausea and vomiting    Relevant Medications    omeprazole (PriLOSEC) 40 mg DR capsule    Gastroesophageal reflux disease with esophagitis without hemorrhage    Patient's reflux symptoms are about 70 to 80% improved.  At this time I would recommend staying on omeprazole 40 mg daily for 2 more months at which time I will reevaluate him.  If he is doing better we will try to cut back on the dosage         Relevant Medications    omeprazole (PriLOSEC) 40 mg DR capsule    Mixed hyperlipidemia    Patient has a mixed hyperlipidemia.  At this time I encouraged him to work on a low-fat low carbohydrate diet and we will repeat his lipid profile in 6 months         Relevant Orders    Lipid panel    Elevated ALT measurement - Primary    Patient's ALT is also mildly elevated.  It is still well less than 100 and I am not overly concerned however we will check a hepatitis profile just to make sure he does not have hepatitis as the " cause.           Relevant Orders    Hepatitis B Core Antibody, Igm    Hepatitis B Surface Antigen    Hepatitis B Surface Antibody    Hepatitis C Antibody     Other Visit Diagnoses         Nausea        Relevant Medications    omeprazole (PriLOSEC) 40 mg DR capsule      Esophagitis        Relevant Medications    omeprazole (PriLOSEC) 40 mg DR capsule                   It has been a pleasure seeing you.  Vesna Martinez MD         [1] No Known Allergies

## 2025-05-13 NOTE — ASSESSMENT & PLAN NOTE
Patient's reflux symptoms are about 70 to 80% improved.  At this time I would recommend staying on omeprazole 40 mg daily for 2 more months at which time I will reevaluate him.  If he is doing better we will try to cut back on the dosage

## 2025-05-13 NOTE — ASSESSMENT & PLAN NOTE
Patient has a mixed hyperlipidemia.  At this time I encouraged him to work on a low-fat low carbohydrate diet and we will repeat his lipid profile in 6 months

## 2025-05-14 LAB
HBV CORE IGM SERPL QL IA: NORMAL
HBV SURFACE AB SERPL IA-ACNC: <5 MIU/ML
HBV SURFACE AG SERPL QL IA: NORMAL
HCV AB SERPL QL IA: NORMAL

## 2025-05-15 ENCOUNTER — TELEPHONE (OUTPATIENT)
Dept: PRIMARY CARE | Facility: CLINIC | Age: 25
End: 2025-05-15
Payer: COMMERCIAL

## 2025-05-15 DIAGNOSIS — R74.01 ELEVATED ALT MEASUREMENT: Primary | ICD-10-CM

## 2025-05-15 NOTE — TELEPHONE ENCOUNTER
----- Message from Vesna Martinez sent at 5/15/2025  9:27 AM EDT -----  Please notify patient his hepatitis screen was negative which means this is not the cause of his elevated liver enzyme.  I would like him to repeat his liver enzymes in 3 months through blood work to see if it is continuing to rise or if it is stabilized or decreasing.  [facility] for [facility] repeat liver enzymes in 3 months  ----- Message -----  From: Francisco J [facility] Results In  Sent: 5/14/2025  10:41 AM EDT  To: Vesna Martinez MD

## 2025-05-16 ENCOUNTER — TELEPHONE (OUTPATIENT)
Dept: PRIMARY CARE | Facility: CLINIC | Age: 25
End: 2025-05-16
Payer: COMMERCIAL

## 2025-05-16 NOTE — TELEPHONE ENCOUNTER
----- Message from Vesna Martinez sent at 5/15/2025  9:27 AM EDT -----  Please notify patient his hepatitis screen was negative which means this is not the cause of his elevated liver enzyme.  I would like him to repeat his liver enzymes in 3 months through blood work to see if it is continuing to rise or if it is stabilized or decreasing.  Orders are in [facility] for the repeat liver enzymes in 3 months  ----- Message -----  From: [facility]  Sent: 5/14/2025  10:41 AM EDT  To: Vesna Martinez MD

## 2025-06-02 ENCOUNTER — APPOINTMENT (OUTPATIENT)
Dept: BEHAVIORAL HEALTH | Facility: CLINIC | Age: 25
End: 2025-06-02
Payer: COMMERCIAL

## 2025-06-04 ENCOUNTER — APPOINTMENT (OUTPATIENT)
Dept: BEHAVIORAL HEALTH | Facility: CLINIC | Age: 25
End: 2025-06-04
Payer: COMMERCIAL

## 2025-06-04 DIAGNOSIS — F41.1 GAD (GENERALIZED ANXIETY DISORDER): Primary | ICD-10-CM

## 2025-06-04 DIAGNOSIS — F42.8 OBSESSIVE THINKING: ICD-10-CM

## 2025-06-04 DIAGNOSIS — R41.840 ATTENTION DISTURBANCE: ICD-10-CM

## 2025-06-04 DIAGNOSIS — F33.0 MILD EPISODE OF RECURRENT MAJOR DEPRESSIVE DISORDER: ICD-10-CM

## 2025-06-04 PROCEDURE — 99214 OFFICE O/P EST MOD 30 MIN: CPT | Performed by: NURSE PRACTITIONER

## 2025-06-04 ASSESSMENT — ANXIETY QUESTIONNAIRES
IF YOU CHECKED OFF ANY PROBLEMS ON THIS QUESTIONNAIRE, HOW DIFFICULT HAVE THESE PROBLEMS MADE IT FOR YOU TO DO YOUR WORK, TAKE CARE OF THINGS AT HOME, OR GET ALONG WITH OTHER PEOPLE: SOMEWHAT DIFFICULT
4. TROUBLE RELAXING: SEVERAL DAYS
7. FEELING AFRAID AS IF SOMETHING AWFUL MIGHT HAPPEN: SEVERAL DAYS
1. FEELING NERVOUS, ANXIOUS, OR ON EDGE: SEVERAL DAYS
3. WORRYING TOO MUCH ABOUT DIFFERENT THINGS: SEVERAL DAYS
GAD7 TOTAL SCORE: 7
5. BEING SO RESTLESS THAT IT IS HARD TO SIT STILL: SEVERAL DAYS
6. BECOMING EASILY ANNOYED OR IRRITABLE: SEVERAL DAYS
2. NOT BEING ABLE TO STOP OR CONTROL WORRYING: SEVERAL DAYS

## 2025-06-04 ASSESSMENT — PATIENT HEALTH QUESTIONNAIRE - PHQ9
3. TROUBLE FALLING OR STAYING ASLEEP OR SLEEPING TOO MUCH: SEVERAL DAYS
1. LITTLE INTEREST OR PLEASURE IN DOING THINGS: SEVERAL DAYS
8. MOVING OR SPEAKING SO SLOWLY THAT OTHER PEOPLE COULD HAVE NOTICED. OR THE OPPOSITE, BEING SO FIGETY OR RESTLESS THAT YOU HAVE BEEN MOVING AROUND A LOT MORE THAN USUAL: SEVERAL DAYS
9. THOUGHTS THAT YOU WOULD BE BETTER OFF DEAD, OR OF HURTING YOURSELF: NOT AT ALL
10. IF YOU CHECKED OFF ANY PROBLEMS, HOW DIFFICULT HAVE THESE PROBLEMS MADE IT FOR YOU TO DO YOUR WORK, TAKE CARE OF THINGS AT HOME, OR GET ALONG WITH OTHER PEOPLE: SOMEWHAT DIFFICULT
7. TROUBLE CONCENTRATING ON THINGS, SUCH AS READING THE NEWSPAPER OR WATCHING TELEVISION: SEVERAL DAYS
2. FEELING DOWN, DEPRESSED OR HOPELESS: SEVERAL DAYS
5. POOR APPETITE OR OVEREATING: SEVERAL DAYS
4. FEELING TIRED OR HAVING LITTLE ENERGY: SEVERAL DAYS
6. FEELING BAD ABOUT YOURSELF - OR THAT YOU ARE A FAILURE OR HAVE LET YOURSELF OR YOUR FAMILY DOWN: SEVERAL DAYS

## 2025-06-04 NOTE — PROGRESS NOTES
"Outpatient Psychiatry    Subjective   Vargas Gilman, a 24 y.o. male, presenting to Psychiatry for evaluation.  Patient is referred by Vesna Martinez MD \"Life's been treating me solid. Work is good. Getting by. I always have stuff to deal with and handle when they come up, but I am doing good.\"      Virtual Consent    An interactive audio and video telecommunication system which permits real time communications between the patient (at home) and provider (at home office) was utilized to provide this telehealth service.   Verbal consent was requested and obtained from Vargas Gilman on this date, 06/04/2025 for a telehealth visit and the patient's location was confirmed at the time of the visit.     HPI:    Present Illness - anxiety, depression     Characteristics/Recent psychiatric symptoms (pertinent positives and negatives) - reports work stress is 'better,' and feels more comfortable at his job. As a result his depression has improved, as he feels anhedonia has improved, is able to go out and enjoy planning things with friends, playing video games and enjoys things like going out and gardening/doing the lawn. Admits his gf, who is autistic, is nitpicky, does tend to pick on him and it leaves him feeling down more, and it can leave him feeling more guilty about himself but tries to keep in context that she is mildly autistic. Admits he also does drink 8-10 ETOH a week, and 2, 5g joints a month of THC. Denies deep depression in a long time, no SI. Reports only anxiety is reflected in wanting to do well in his job, pleasing others, chores at home, being a good bf, etc... and making sure he meets others expectations. Anxiety is still a sense of nervousness, on edge, worrying but not as intensely as before. Sleep averages 8-9 hrs a night and is maintaining healthy sleep routine. Energy levels are 'in general low' because he doesn't do a lot of exercise, but in general both mental/physical fatigue have been 'ok'.  Still " dealing with GI issues randomly, and his PCP is managing his issues, and there is a suspected issue tied to a liver enzyme causing the problem. No longer vomiting but can still wake up feeling very nauseated, but at least being on the GERD medication is helping with keeping symptoms better under control. Diarrhea is not an issue. Appetite has been 'fine' but admits he needs to get better control over his appetite as a lot of the food he is eating can be contributing to his poor diet at times. Denies any concerns with racing, obsessive, ruminating or intrusive thoughts outside of hyper-fixating over fun things he likes to plan for himself, and with his gf and friends.     Onset/timeframe - 2 months  Type - anxiety, depression  Duration - situational  Aggravating and/or relieving factors/triggers - need to  please others  Treatment and treatment changes (new meds, dosage increases or decreases, med compliance, therapy frequency, etc.) (Past and Recent) - see below.    Issues: Denies SI/HI/AVH currently.    Psychiatric Review Of Systems:  Depressive Symptoms: anhedonia, appetite decreased, concentration, energy, guilt, hopeless, psychomotor agitation, and sleep decreased   Manic Symptoms: negative  Anxiety Symptoms: General Anxiety Disorder (ELIGIO)ELIGIO Behaviors: difficult to control worry, excessive anxiety/worry, difficulty concentrating, easily fatigued, irritability, restlessness, sleep disturbance, and dread and Obsessive Compulsive Disorder (OCD)OCD Behaviors: denies  Psychotic Symptoms: negative  Other Symptoms:    Current Medications:  Current Medications[1]    Medical History:  Medical History[2]    Substance Use History:  Tobacco use: denies  Use of alcohol: moderate  Use of caffeine: coffee 1 /day  Use of other substances: THC - 2x a month (5mg each time)  Legal consequences of substance use: denies  Substance use disorder treatment: n/a    Record Review: brief     Medical Review Of Systems:  Behavioral/Psych:  positive for anxiety and depression    OARRS:  Ryan Pickard, APRN-CNP on 6/4/2025  8:30 AM  I have personally reviewed the OARRS report for Vargas Gilman. I have considered the risks of abuse, dependence, addiction and diversion    Is the patient prescribed a combination of a benzodiazepine and opioid?  No    Last Urine Drug Screen / ordered today: No  No results found for this or any previous visit (from the past 8760 hours).  N/A    Clinical rationale for not completing a Urine Drug Screen: N/A      Controlled Substance Agreement:  Date of the Last Agreement: N/A    Objective   Mental Status Exam  Appearance: Well-groomed  Attitude: Calm, cooperative, occasionally distracted at times, but engaged in conversation.  Behavior: Appropriate eye contact.   Motor Activity: No psychomotor agitation or retardation. No abnormal movements, tremors or tics. No evidence of extrapyramidal symptoms or tardive dyskinesia.  Speech: Regular rate, rhythm, volume. Spontaneous, no pressured speech.  Mood: 'good'  Affect: Flat, anxious, yet mood congruent.  Thought Process: Linear, logical, and goal-directed, obsessive. No loose associations or gross thought disorganization.  Thought Content: Denied current suicidal ideation or thoughts of harm to self, denied homicidal ideation or thoughts of harm to others. No delusional thinking elicited. No perseverations or obsessions identified. Health/GI issues are weighing on his mind still, and a need to still please others - family, friends, work but is working on that.   Perception: Did not endorse auditory or visual hallucinations, did not appear to be responding to hallucinatory stimuli.   Cognition: Alert, oriented x3. Preserved attention span and concentration, recent and remote memory. Adequate fund of knowledge. No deficits in language.   Insight: Fair, in regards to understanding mental health condition  Judgement: Intact        ELIGIO-7/PHQ-9 scores reviewed: 7, 8 compared to 11, 12  reflecting improvement in both anxiety and depression.      Vitals:  There were no vitals filed for this visit.    Other Objective Information:  Labs not ordered nor needed at this time.      Risk Assessment:  Risk of harm to self: Low Risk -- Risk factors include: No significant risk factors identified on screening Protective factors include:Denies current suicidal ideation and Denies history of suicide attempts     Risk of harm to others: Low Risk - Risk factors include: No significant risk factors identified on screening. Protective factors include: Lack of known history of harm to others  and Lack of known history of violent ideation     PSYCHOTHERAPY:  Plan: goals, type of therapy/modality, mental status when leaving, dx, time, continues seeing therapist     Vargas was seen today for mdd (major depressive disorder), anxiety, follow-up, med management, sleeping problem and obsessive thinking.  Diagnoses and all orders for this visit:  ELIIGO (generalized anxiety disorder) (Primary)  Attention disturbance  Obsessive thinking  Mild episode of recurrent major depressive disorder       Plan/Recommendations:  Medications: Continues taking Viibryd 40mg every day.  Orders: Pleasant and calmer, if not slightly anxious. Talked about concerns with using THC - pros/cons and continued use and impact on mental health. Reviewed and agreed however that as he reports and presents himself to be stable, to leaving medication and treatment plan in place and can push out follow-up appt. Continues to see therapist if needed.   Follow up: 12/01  Call  Psychiatry at [phone] with issues.  For [place] residents, [employer] is a 24/7 hotline you can call for assistance [[phone]]. Please call [phone] or go to your closest Emergency Room if you feel unsafe. This includes thoughts of hurting yourself or anyone else, or having other troubles such as hearing voices, seeing visions, or having new and scary thoughts about  the people around you.    Review with patient: Treatment plan reviewed with the patient.  Medication risks/benefit reviewed with the patient  Time Spent:    Prep time: 1 min.  Direct patient time: 30 min.  Documentation time: 5 min.  Total time: 36 min.    Ryan Pickard, APRN-CNP         [1]   Current Outpatient Medications:     calcium carbonate (Tums) 200 mg calcium chewable tablet, Chew 1 tablet 4 times a day as needed for indigestion or heartburn., Disp: , Rfl:     cholecalciferol (Vitamin D3) 25 MCG (1000 UT) tablet, Take 1 tablet (1,000 Units) by mouth once daily., Disp: , Rfl:     omeprazole (PriLOSEC) 40 mg DR capsule, Take 1 capsule (40 mg) by mouth once daily in the morning. Take before meals. Do not crush or chew., Disp: 30 capsule, Rfl: 2    vilazodone (Viibryd) 40 mg tablet, Take 1 tablet (40 mg) by mouth once daily in the morning. Take before meals., Disp: 90 tablet, Rfl: 3  [2]   Past Medical History:  Diagnosis Date    Mixed anxiety and depressive disorder     Personal history of other diseases of the digestive system 11/15/2019    History of constipation    Personal history of other diseases of the digestive system 11/15/2019    History of hemorrhoids    Personal history of other diseases of the respiratory system 01/02/2019    History of acute sinusitis    Personal history of other diseases of the respiratory system 01/02/2019    History of acute bronchitis

## 2025-06-24 ENCOUNTER — APPOINTMENT (OUTPATIENT)
Dept: PRIMARY CARE | Facility: CLINIC | Age: 25
End: 2025-06-24
Payer: COMMERCIAL

## 2025-07-18 ENCOUNTER — OFFICE VISIT (OUTPATIENT)
Dept: PRIMARY CARE | Facility: CLINIC | Age: 25
End: 2025-07-18
Payer: COMMERCIAL

## 2025-07-18 ENCOUNTER — APPOINTMENT (OUTPATIENT)
Dept: PRIMARY CARE | Facility: CLINIC | Age: 25
End: 2025-07-18
Payer: COMMERCIAL

## 2025-07-18 VITALS
BODY MASS INDEX: 28.44 KG/M2 | DIASTOLIC BLOOD PRESSURE: 80 MMHG | SYSTOLIC BLOOD PRESSURE: 119 MMHG | WEIGHT: 214.6 LBS | OXYGEN SATURATION: 97 % | HEART RATE: 98 BPM | HEIGHT: 73 IN

## 2025-07-18 DIAGNOSIS — R73.01 ELEVATED FASTING GLUCOSE: ICD-10-CM

## 2025-07-18 DIAGNOSIS — K20.90 ESOPHAGITIS: ICD-10-CM

## 2025-07-18 DIAGNOSIS — E78.2 MIXED HYPERLIPIDEMIA: Primary | ICD-10-CM

## 2025-07-18 DIAGNOSIS — K29.70 GASTRITIS WITHOUT BLEEDING, UNSPECIFIED CHRONICITY, UNSPECIFIED GASTRITIS TYPE: ICD-10-CM

## 2025-07-18 DIAGNOSIS — R11.2 NAUSEA AND VOMITING, UNSPECIFIED VOMITING TYPE: ICD-10-CM

## 2025-07-18 DIAGNOSIS — R11.0 NAUSEA: ICD-10-CM

## 2025-07-18 DIAGNOSIS — K21.00 GASTROESOPHAGEAL REFLUX DISEASE WITH ESOPHAGITIS WITHOUT HEMORRHAGE: ICD-10-CM

## 2025-07-18 PROCEDURE — 3008F BODY MASS INDEX DOCD: CPT | Performed by: INTERNAL MEDICINE

## 2025-07-18 PROCEDURE — 1036F TOBACCO NON-USER: CPT | Performed by: INTERNAL MEDICINE

## 2025-07-18 PROCEDURE — G8433 SCR FOR DEP NOT CPT DOC RSN: HCPCS | Performed by: INTERNAL MEDICINE

## 2025-07-18 PROCEDURE — 99214 OFFICE O/P EST MOD 30 MIN: CPT | Performed by: INTERNAL MEDICINE

## 2025-07-18 RX ORDER — OMEPRAZOLE 40 MG/1
40 CAPSULE, DELAYED RELEASE ORAL
Qty: 90 CAPSULE | Refills: 1 | Status: SHIPPED | OUTPATIENT
Start: 2025-07-18

## 2025-07-18 ASSESSMENT — ENCOUNTER SYMPTOMS
LIGHT-HEADEDNESS: 0
VOMITING: 1
FEVER: 0
DIZZINESS: 0
FREQUENCY: 0
DIARRHEA: 0
SORE THROAT: 0
DYSURIA: 0
CONSTIPATION: 0
SHORTNESS OF BREATH: 0
COUGH: 0
NAUSEA: 1
ARTHRALGIAS: 0
TROUBLE SWALLOWING: 0
PALPITATIONS: 0
FATIGUE: 0
ABDOMINAL PAIN: 0

## 2025-07-18 ASSESSMENT — PAIN SCALES - GENERAL: PAINLEVEL_OUTOF10: 0-NO PAIN

## 2025-07-18 NOTE — PATIENT INSTRUCTIONS
Elevate head of bed with cinder blocks 6 inches    Continue the omeprazole 40 mg daily    Follow up Dr Martinez in 2 months for reeval

## 2025-07-18 NOTE — ASSESSMENT & PLAN NOTE
Patient's symptoms continue to improve.  At this time he will continue omeprazole 40 mg daily and see me back in 2 months.  He was encouraged to elevate the head of his bed about 6 inches with cinderblocks because he still has some symptoms early in the morning when he wakes up.  If his symptoms do not continue to improve or plateau we will send him to GI

## 2025-07-18 NOTE — PROGRESS NOTES
Subjective   Patient ID: Vargas Gilmna is a 24 y.o. male who presents for re-evaluation for GERD.    Patient is here for 1 month follow-up on his gastroesophageal reflux disease heartburn and vomiting.  Patient says he has vomited 3 times in the past month which is dramatically lower than previously.  1 time it was only because he trolley to swallow a pill that was too big and he has a very sensitive gag reflex since this all began    Patient has multiple questions about his labs.  I reviewed all his labs with him again in person today including his elevated LDL, elevated triglycerides and elevated glucose levels.  He has gained 40 pounds in the last few years so he is encouraged to work on a low-fat and low carbohydrate diet to help get his LDL triglycerides and sugars down.  Part of this may be genetic in part environmental or dietary.        Review of Systems   Constitutional:  Negative for fatigue and fever.   HENT:  Negative for sore throat and trouble swallowing.    Eyes:  Negative for visual disturbance.   Respiratory:  Negative for cough and shortness of breath.    Cardiovascular:  Negative for chest pain, palpitations and leg swelling.   Gastrointestinal:  Positive for nausea and vomiting. Negative for abdominal pain, constipation and diarrhea.   Genitourinary:  Negative for dysuria and frequency.   Musculoskeletal:  Negative for arthralgias.   Skin:  Negative for rash.   Neurological:  Negative for dizziness and light-headedness.       Objective   Medication Documentation Review Audit       Reviewed by Vesna Martinez MD (Physician) on 07/18/25 at 0822      Medication Order Taking? Sig Documenting Provider Last Dose Status   calcium carbonate (Tums) 200 mg calcium chewable tablet [id number]  Chew 1 tablet 4 times a day as needed for indigestion or heartburn. Historical Provider, MD  Active   cholecalciferol (Vitamin D3) 25 MCG (1000 UT) tablet [id number]  Take 1 tablet (1,000 Units) by mouth once daily.  "Historical Provider, MD  Active   omeprazole (PriLOSEC) 40 mg DR capsule [id number]  Take 1 capsule (40 mg) by mouth once daily in the morning. Take before meals. Do not crush or chew. Vesna Martinez MD  Active   vilazodone (Viibryd) 40 mg tablet [id number]  Take 1 tablet (40 mg) by mouth once daily in the morning. Take before meals. Ryan Pickard, APRN-CNP  Active                  Allergies[1]    /80   Pulse 98   Ht 1.848 m (6' 0.75\")   Wt 97.3 kg (214 lb 9.6 oz)   SpO2 97%   BMI 28.51 kg/m²     Physical Exam  Constitutional:       Appearance: Normal appearance.   HENT:      Head: Normocephalic and atraumatic.      Nose: Nose normal.     Eyes:      Extraocular Movements: Extraocular movements intact.      Pupils: Pupils are equal, round, and reactive to light.       Cardiovascular:      Rate and Rhythm: Normal rate and regular rhythm.   Pulmonary:      Breath sounds: Normal breath sounds.   Abdominal:      General: Abdomen is flat. Bowel sounds are normal.      Palpations: Abdomen is soft.     Musculoskeletal:      Right lower leg: No edema.      Left lower leg: No edema.     Neurological:      Mental Status: He is alert.           Assessment/Plan   Problem List Items Addressed This Visit       Nausea and vomiting    Relevant Medications    omeprazole (PriLOSEC) 40 mg DR capsule    Gastroesophageal reflux disease with esophagitis without hemorrhage    Patient's symptoms continue to improve.  At this time he will continue omeprazole 40 mg daily and see me back in 2 months.  He was encouraged to elevate the head of his bed about 6 inches with cinderblocks because he still has some symptoms early in the morning when he wakes up.  If his symptoms do not continue to improve or plateau we will send him to GI         Relevant Medications    omeprazole (PriLOSEC) 40 mg DR capsule    Gastritis    Relevant Medications    omeprazole (PriLOSEC) 40 mg DR capsule    Mixed hyperlipidemia - Primary    Cholesterol was " reviewed with the patient again and all his questions answered.  He is encouraged to work on a low-fat low carbohydrate diet and work on weight loss to improve his numbers.  He does not have a significant family history of premature heart disease         Elevated fasting glucose     Other Visit Diagnoses         Nausea        Relevant Medications    omeprazole (PriLOSEC) 40 mg DR capsule      Esophagitis        Relevant Medications    omeprazole (PriLOSEC) 40 mg DR capsule                   It has been a pleasure seeing you.  Vesna Martinez MD        [1] No Known Allergies

## 2025-07-18 NOTE — ASSESSMENT & PLAN NOTE
Cholesterol was reviewed with the patient again and all his questions answered.  He is encouraged to work on a low-fat low carbohydrate diet and work on weight loss to improve his numbers.  He does not have a significant family history of premature heart disease

## 2025-07-22 ENCOUNTER — APPOINTMENT (OUTPATIENT)
Dept: PRIMARY CARE | Facility: CLINIC | Age: 25
End: 2025-07-22
Payer: COMMERCIAL

## 2025-07-23 ENCOUNTER — APPOINTMENT (OUTPATIENT)
Dept: PRIMARY CARE | Facility: CLINIC | Age: 25
End: 2025-07-23
Payer: COMMERCIAL

## 2025-08-15 DIAGNOSIS — R74.01 ELEVATED ALT MEASUREMENT: ICD-10-CM

## 2025-09-25 ENCOUNTER — APPOINTMENT (OUTPATIENT)
Dept: PRIMARY CARE | Facility: CLINIC | Age: 25
End: 2025-09-25
Payer: COMMERCIAL

## 2025-12-01 ENCOUNTER — APPOINTMENT (OUTPATIENT)
Dept: BEHAVIORAL HEALTH | Facility: CLINIC | Age: 25
End: 2025-12-01
Payer: COMMERCIAL